# Patient Record
Sex: MALE | Race: WHITE | NOT HISPANIC OR LATINO | URBAN - METROPOLITAN AREA
[De-identification: names, ages, dates, MRNs, and addresses within clinical notes are randomized per-mention and may not be internally consistent; named-entity substitution may affect disease eponyms.]

---

## 2019-05-22 ENCOUNTER — OFFICE VISIT (OUTPATIENT)
Dept: INTERNAL MEDICINE | Facility: CLINIC | Age: 24
End: 2019-05-22
Payer: MEDICAID

## 2019-05-22 VITALS
HEIGHT: 65 IN | TEMPERATURE: 98 F | WEIGHT: 132 LBS | DIASTOLIC BLOOD PRESSURE: 67 MMHG | SYSTOLIC BLOOD PRESSURE: 111 MMHG | RESPIRATION RATE: 16 BRPM | OXYGEN SATURATION: 97 % | BODY MASS INDEX: 21.99 KG/M2 | HEART RATE: 65 BPM

## 2019-05-22 DIAGNOSIS — M20.12 HALLUX VALGUS OF LEFT FOOT: Primary | ICD-10-CM

## 2019-05-22 DIAGNOSIS — Z00.00 ANNUAL PHYSICAL EXAM: ICD-10-CM

## 2019-05-22 PROCEDURE — 99395 PREV VISIT EST AGE 18-39: CPT | Mod: S$GLB,,, | Performed by: NURSE PRACTITIONER

## 2019-05-22 PROCEDURE — 99395 PR PREVENTIVE VISIT,EST,18-39: ICD-10-PCS | Mod: S$GLB,,, | Performed by: NURSE PRACTITIONER

## 2019-05-22 NOTE — PROGRESS NOTES
Subjective:       Patient ID: Ernesto Bustillos is a 23 y.o. male.    Chief Complaint: Establish Care    Pt states onset since birth with left foot deformity, hallux valgus- states now starting with limp, pain to foot, destroying shoes, getting ingrown toenails to left great toe    Review of Systems   Constitutional: Negative.    HENT: Negative.    Eyes: Negative.    Respiratory: Negative.    Cardiovascular: Negative.    Gastrointestinal: Negative.    Genitourinary: Negative.    Musculoskeletal: Positive for arthralgias (left foot pain, now starting with right achilles pain from compensation) and gait problem (favor left leg).   Skin: Negative.    Psychiatric/Behavioral: Negative.        Objective:      Physical Exam   Constitutional: He is oriented to person, place, and time. He appears well-developed and well-nourished.   HENT:   Head: Normocephalic.   Right Ear: External ear normal.   Left Ear: External ear normal.   Eyes: Right eye exhibits no discharge. Left eye exhibits no discharge.   Neck: Neck supple. No tracheal deviation present. No thyromegaly present.   Cardiovascular: Normal rate, regular rhythm, normal heart sounds and intact distal pulses.   No murmur heard.  Pulmonary/Chest: Effort normal and breath sounds normal. He has no wheezes.   Abdominal: Soft. Bowel sounds are normal. He exhibits no mass. There is no tenderness. There is no guarding.   Musculoskeletal: He exhibits no edema.        Left foot: There is deformity (hallux valgus).   Feet:   Left Foot:   Skin Integrity: Positive for erythema. Negative for ulcer or skin breakdown.   Neurological: He is alert and oriented to person, place, and time.   Skin: Skin is warm and dry.   Psychiatric: He has a normal mood and affect. His behavior is normal. Judgment normal.       Assessment:       No diagnosis found.    Plan:       1. Hallux valgus of left foot  Ambulatory referral to Orthopedics   2. Annual physical exam  CBC auto differential    TSH     Comprehensive metabolic panel    Lipid panel

## 2019-05-28 LAB
ABS NRBC COUNT: 0 X 10 3/UL (ref 0–0.01)
ABSOLUTE BASOPHIL: 0.01 X 10 3/UL (ref 0–0.22)
ABSOLUTE EOSINOPHIL: 0.14 X 10 3/UL (ref 0.04–0.54)
ABSOLUTE IMMATURE GRAN: 0.01 X 10 3/UL (ref 0–0.04)
ABSOLUTE LYMPHOCYTE: 1.15 X 10 3/UL (ref 0.86–4.75)
ABSOLUTE MONOCYTE: 0.42 X 10 3/UL (ref 0.22–1.08)
ALBUMIN SERPL-MCNC: 5.1 G/DL (ref 3.5–5.2)
ALBUMIN/GLOB SERPL ELPH: 2.4 {RATIO} (ref 1–2.7)
ALP ISOS SERPL LEV INH-CCNC: 75 IU/L (ref 40–130)
ALT (SGPT): 46 U/L (ref 0–41)
ANION GAP SERPL CALC-SCNC: 8 MMOL/L (ref 8–17)
AST SERPL-CCNC: 32 U/L (ref 0–40)
BASOPHILS NFR BLD: 0.2 %
BILIRUBIN, TOTAL: 0.3 MG/DL (ref 0–1.2)
BUN/CREAT SERPL: 10.2 (ref 6–20)
CALCIUM SERPL-MCNC: 9.9 MG/DL (ref 8.6–10.2)
CARBON DIOXIDE, CO2: 33 MMOL/L (ref 22–29)
CHLORIDE: 103 MMOL/L (ref 98–107)
CHOLEST SERPL-MSCNC: 173 MG/DL (ref 100–200)
CREAT SERPL-MCNC: 0.82 MG/DL (ref 0.7–1.2)
EOSINOPHIL NFR BLD: 2.6 %
GFR ESTIMATION: 115.43
GLOBULIN: 2.1 G/DL (ref 1.5–4.5)
GLUCOSE: 97 MG/DL (ref 74–106)
HCT VFR BLD AUTO: 43.6 % (ref 42–52)
HDLC SERPL-MCNC: 71 MG/DL
HGB BLD-MCNC: 14 G/DL (ref 14–18)
IMMATURE GRANULOCYTES: 0.2 % (ref 0–0.5)
LDL/HDL RATIO: 1.3 (ref 1–3)
LDLC SERPL CALC-MCNC: 93.8 MG/DL (ref 0–100)
LYMPHOCYTES NFR BLD: 21.6 %
MCH RBC QN AUTO: 29.8 PG (ref 27–32)
MCHC RBC AUTO-ENTMCNC: 32.1 G/DL (ref 32–36)
MCV RBC AUTO: 92.8 FL (ref 80–94)
MONOCYTES NFR BLD: 7.9 %
NEUTROPHILS ABSOLUTE COUNT: 3.6 X 10 3/UL (ref 2.15–7.56)
NEUTROPHILS NFR BLD: 67.5 %
NUCLEATED RED BLOOD CELLS: 0 /100 WBC (ref 0–0.2)
PLATELET # BLD AUTO: 212 X 10 3/UL (ref 135–400)
POTASSIUM: 4.8 MMOL/L (ref 3.5–5.1)
PROT SNV-MCNC: 7.2 G/DL (ref 6.4–8.3)
RBC # BLD AUTO: 4.7 X 10 6/UL (ref 4.7–6.1)
RDW-SD: 41.1 FL (ref 37–54)
SODIUM: 144 MMOL/L (ref 136–145)
TRIGL SERPL-MCNC: 41 MG/DL (ref 0–150)
TSH SERPL DL<=0.005 MIU/L-ACNC: 1.17 UIU/ML (ref 0.27–4.2)
UREA NITROGEN (BUN): 8.4 MG/DL (ref 6–20)
WBC # BLD: 5.33 X 10 3/UL (ref 4.3–10.8)

## 2019-06-27 ENCOUNTER — TELEPHONE (OUTPATIENT)
Dept: INTERNAL MEDICINE | Facility: CLINIC | Age: 24
End: 2019-06-27

## 2019-07-03 ENCOUNTER — OFFICE VISIT (OUTPATIENT)
Dept: ORTHOPEDICS | Facility: CLINIC | Age: 24
End: 2019-07-03
Payer: MEDICAID

## 2019-07-03 VITALS — TEMPERATURE: 98 F | WEIGHT: 126.63 LBS | HEIGHT: 64 IN | BODY MASS INDEX: 21.62 KG/M2

## 2019-07-03 DIAGNOSIS — M20.12 HALLUX VALGUS OF LEFT FOOT: ICD-10-CM

## 2019-07-03 DIAGNOSIS — M21.612 BUNION OF GREAT TOE OF LEFT FOOT: Primary | ICD-10-CM

## 2019-07-03 PROCEDURE — 99201 PR OFFICE/OUTPT VISIT,NEW,LEVL I: CPT | Mod: 25,S$GLB,, | Performed by: ORTHOPAEDIC SURGERY

## 2019-07-03 PROCEDURE — 73620 PR  X-RAY FOOT 2 VW: ICD-10-PCS | Mod: TC,LT,S$GLB, | Performed by: ORTHOPAEDIC SURGERY

## 2019-07-03 PROCEDURE — 73620 X-RAY EXAM OF FOOT: CPT | Mod: TC,LT,S$GLB, | Performed by: ORTHOPAEDIC SURGERY

## 2019-07-03 PROCEDURE — 99201 PR OFFICE/OUTPT VISIT,NEW,LEVL I: ICD-10-PCS | Mod: 25,S$GLB,, | Performed by: ORTHOPAEDIC SURGERY

## 2019-07-03 NOTE — PROGRESS NOTES
Subjective:      Patient ID: Ernesto Bustillos is a 23 y.o. male.    Chief Complaint: Pain of the Left Foot    HPI this is a 23-year-old man with a lifelong history of left hallux valgus with bunion deformity.  The great toe under rides the 2nd toe.  It has recently become very painful.  He has tried wearing wider shoes without relief    Review of Systems   Constitution: Negative for fever and weight loss.   Cardiovascular: Negative for chest pain and leg swelling.   Musculoskeletal: Positive for joint pain and joint swelling. Negative for arthritis, muscle weakness and stiffness.   Gastrointestinal: Negative for change in bowel habit.   Genitourinary: Negative for bladder incontinence and hematuria.   Neurological: Negative for focal weakness, numbness, paresthesias and sensory change.         Objective:      Examination shows significant hallux valgus on the left.  Active and passive range of motion of the 1st MTP joint is normal.  The great toe completely under rides the 2nd toe.  He is tender with palpation of the 1st MTP joint and has mild swelling and redness      Ortho/SPM Exam            Assessment:       Encounter Diagnoses   Name Primary?    Bunion of great toe of left foot Yes    Hallux valgus of left foot           Plan:       Ernesto was seen today for pain.    Diagnoses and all orders for this visit:    Bunion of great toe of left foot    Hallux valgus of left foot     X-ray of the left foot shows an intermetatarsal angle between the 1st and 2nd metatarsals to be 9°.  The metatarsophalangeal angle is 35°.  Recommend bunionectomy with chevron osteotomy and Akin osteotomy.  He will also have release of the adductor tendon.  Will do this in the near future at his convenience

## 2019-07-15 LAB
BASOPHILS NFR SNV MANUAL: 0.4 % (ref 0–3)
BUN SERPL-MCNC: 11 MG/DL (ref 7–18)
BUN/CREAT SERPL: 13.25 RATIO (ref 7–18)
CALCIUM SERPL-MCNC: 9.3 MG/DL (ref 8.8–10.5)
CHLORIDE SERPL-SCNC: 107 MMOL/L (ref 100–108)
CO2 SERPL-SCNC: 29 MMOL/L (ref 21–32)
CREAT SERPL-MCNC: 0.83 MG/DL (ref 0.7–1.3)
EOSINOPHIL NFR SNV MANUAL: 2.5 % (ref 1–3)
ERYTHROCYTE [DISTWIDTH] IN BLOOD BY AUTOMATED COUNT: 11.7 % (ref 12.5–18)
GFR ESTIMATION: > 60
GLUCOSE SERPL-MCNC: 94 MG/DL (ref 70–110)
HCT VFR BLD AUTO: 42.6 % (ref 42–52)
HGB BLD-MCNC: 14.8 G/DL (ref 14–18)
LYMPHOCYTES NFR SNV MANUAL: 30.5 % (ref 25–40)
MANUAL NRBC PER 100 CELLS: 0 %
MCH RBC QN AUTO: 30.1 PG (ref 27–31.2)
MCHC RBC AUTO-ENTMCNC: 34.7 G/DL (ref 31.8–35.4)
MCV RBC AUTO: 86.8 FL (ref 80–97)
MONOCYTES/100 LEUKOCYTES: 8.7 % (ref 1–15)
NEUTROPHILS NFR BLD: 2.97 10*3/UL (ref 1.8–7.7)
NEUTROPHILS NFR SNV MANUAL: 57.7 % (ref 37–80)
PLATELETS: 194 10*3/UL (ref 142–424)
POTASSIUM SERPL-SCNC: 4.2 MMOL/L (ref 3.6–5.2)
RBC # BLD AUTO: 4.91 10*6/UL (ref 4.7–6.1)
SODIUM BLD-SCNC: 144 MMOL/L (ref 135–145)
WBC # BLD: 5.2 10*3/UL (ref 4.6–10.2)

## 2019-07-16 ENCOUNTER — OUTSIDE PLACE OF SERVICE (OUTPATIENT)
Dept: ADMINISTRATIVE | Facility: OTHER | Age: 24
End: 2019-07-16
Payer: MEDICAID

## 2019-07-16 PROCEDURE — 28296 PR CORRECT BUNION, DISTAL METATARSAL OSTEOTOMY: ICD-10-PCS | Mod: LT,,, | Performed by: ORTHOPAEDIC SURGERY

## 2019-07-16 PROCEDURE — 28296 COR HLX VLGS DSTL MTAR OSTEO: CPT | Mod: LT,,, | Performed by: ORTHOPAEDIC SURGERY

## 2019-07-23 ENCOUNTER — OFFICE VISIT (OUTPATIENT)
Dept: ORTHOPEDICS | Facility: CLINIC | Age: 24
End: 2019-07-23
Payer: MEDICAID

## 2019-07-23 DIAGNOSIS — M21.612 BUNION OF GREAT TOE OF LEFT FOOT: Primary | ICD-10-CM

## 2019-07-23 PROCEDURE — 99024 PR POST-OP FOLLOW-UP VISIT: ICD-10-PCS | Mod: S$GLB,,, | Performed by: ORTHOPAEDIC SURGERY

## 2019-07-23 PROCEDURE — 99024 POSTOP FOLLOW-UP VISIT: CPT | Mod: S$GLB,,, | Performed by: ORTHOPAEDIC SURGERY

## 2019-07-23 RX ORDER — OXYCODONE AND ACETAMINOPHEN 5; 325 MG/1; MG/1
1 TABLET ORAL EVERY 8 HOURS PRN
Qty: 21 TABLET | Refills: 0
Start: 2019-07-23 | End: 2021-12-21 | Stop reason: ALTCHOICE

## 2019-07-23 NOTE — PROGRESS NOTES
Subjective:      Patient ID: Ernesto Bustillos is a 23 y.o. male.    Chief Complaint: Post-op Evaluation    HPI patient is 1 week postop bunionectomy with double osteotomy of the 1st metatarsal and great toe.    ROS unchanged from prior visit      Objective:        The incisions are clean.  There is no drainage.  There is minimal swelling.  There is no increased redness or warmth    Ortho/SPM Exam            Assessment:       Encounter Diagnosis   Name Primary?    Bunion of great toe of left foot Yes          Plan:       Ernesto was seen today for post-op evaluation.    Diagnoses and all orders for this visit:    Bunion of great toe of left foot     He is placed in his recovery insoles and night splints.  Return 1 week for suture removal

## 2019-07-29 RX ORDER — OXYCODONE AND ACETAMINOPHEN 10; 325 MG/1; MG/1
TABLET ORAL
Refills: 0 | COMMUNITY
Start: 2019-07-16 | End: 2021-12-21 | Stop reason: ALTCHOICE

## 2019-07-30 ENCOUNTER — OFFICE VISIT (OUTPATIENT)
Dept: ORTHOPEDICS | Facility: CLINIC | Age: 24
End: 2019-07-30
Payer: MEDICAID

## 2019-07-30 DIAGNOSIS — M21.612 BUNION OF GREAT TOE OF LEFT FOOT: Primary | ICD-10-CM

## 2019-07-30 PROCEDURE — 99024 POSTOP FOLLOW-UP VISIT: CPT | Mod: S$GLB,,, | Performed by: ORTHOPAEDIC SURGERY

## 2019-07-30 PROCEDURE — 99024 PR POST-OP FOLLOW-UP VISIT: ICD-10-PCS | Mod: S$GLB,,, | Performed by: ORTHOPAEDIC SURGERY

## 2019-07-30 NOTE — PROGRESS NOTES
Subjective:      Patient ID: Ernesto Bustillos is a 23 y.o. male.    Chief Complaint: Post-op Evaluation    HPI patient is 2 weeks postop bunionectomy with double osteotomy.  He is still ambulating with crutches weight-bearing on his heel  ROS    unchanged from prior visit  Objective:            Ortho/SPM Exam  Incisions are clean.  There is no drainage. He has mild to moderate swelling          Assessment:       Encounter Diagnosis   Name Primary?    Bunion of great toe of left foot Yes          Plan:       Ernesto was seen today for post-op evaluation.    Diagnoses and all orders for this visit:    Bunion of great toe of left foot     Sutures are removed today.  He is to continue with his recovery and soles and night splints.  Return 2 weeks for recheck

## 2019-08-12 RX ORDER — OXYCODONE AND ACETAMINOPHEN 7.5; 325 MG/1; MG/1
TABLET ORAL
Refills: 0 | COMMUNITY
Start: 2019-07-23 | End: 2021-12-21 | Stop reason: ALTCHOICE

## 2019-08-13 ENCOUNTER — OFFICE VISIT (OUTPATIENT)
Dept: ORTHOPEDICS | Facility: CLINIC | Age: 24
End: 2019-08-13
Payer: MEDICAID

## 2019-08-13 DIAGNOSIS — M20.12 HALLUX VALGUS OF LEFT FOOT: Primary | ICD-10-CM

## 2019-08-13 PROCEDURE — 99024 POSTOP FOLLOW-UP VISIT: CPT | Mod: S$GLB,,, | Performed by: ORTHOPAEDIC SURGERY

## 2019-08-13 PROCEDURE — 99024 PR POST-OP FOLLOW-UP VISIT: ICD-10-PCS | Mod: S$GLB,,, | Performed by: ORTHOPAEDIC SURGERY

## 2019-08-13 RX ORDER — HYDROCODONE BITARTRATE AND ACETAMINOPHEN 7.5; 325 MG/1; MG/1
TABLET ORAL
Refills: 0 | COMMUNITY
Start: 2019-07-30 | End: 2021-12-21 | Stop reason: ALTCHOICE

## 2019-08-13 NOTE — PROGRESS NOTES
Subjective:      Patient ID: Ernesto Bustillos is a 23 y.o. male.    Chief Complaint: Post-op Evaluation of the Left Foot    HPI the patient is 1 month postop bunionectomy with chevron and Akin osteotomy.  He complains of pain beneath the bottom of the 1st metatarsal head    ROS    unchanged from prior visit  Objective:            Ortho/SPM Exam  His incisions are well healed.  He has good correction of his hallux valgus          Assessment:       Encounter Diagnosis   Name Primary?    Hallux valgus of left foot Yes          Plan:       Ernesto was seen today for post-op evaluation.    Diagnoses and all orders for this visit:    Hallux valgus of left foot     He is placed in a toe spacer and can wean back into a wide shoe

## 2019-09-03 ENCOUNTER — OFFICE VISIT (OUTPATIENT)
Dept: ORTHOPEDICS | Facility: CLINIC | Age: 24
End: 2019-09-03
Payer: MEDICAID

## 2019-09-03 DIAGNOSIS — M20.12 HALLUX VALGUS OF LEFT FOOT: Primary | ICD-10-CM

## 2019-09-03 PROCEDURE — 99024 PR POST-OP FOLLOW-UP VISIT: ICD-10-PCS | Mod: S$GLB,,, | Performed by: ORTHOPAEDIC SURGERY

## 2019-09-03 PROCEDURE — 99024 POSTOP FOLLOW-UP VISIT: CPT | Mod: S$GLB,,, | Performed by: ORTHOPAEDIC SURGERY

## 2019-09-03 NOTE — PROGRESS NOTES
Subjective:      Patient ID: Ernesto Bustillos is a 23 y.o. male.    Chief Complaint: Post-op Evaluation of the Left Foot    HPI patient is 7 weeks postop bunionectomy with double osteotomy.  He complains of pain at the MTP joint    ROS unchanged from prior visit      Objective:            Ortho/SPM Exam  He has excellent correction of the hallux valgus and bunion deformity.  He has marked limitation of range of motion and this is causing his pain. He only extends approximately 10-12 degrees and flexes 20° at the MTP joint.          Assessment:       Encounter Diagnosis   Name Primary?    Hallux valgus of left foot Yes          Plan:       Ernesto was seen today for post-op evaluation.    Diagnoses and all orders for this visit:    Hallux valgus of left foot      He is instructed in passive stretching of the MTP joint.  Return 3 weeks for recheck

## 2019-09-24 ENCOUNTER — OFFICE VISIT (OUTPATIENT)
Dept: ORTHOPEDICS | Facility: CLINIC | Age: 24
End: 2019-09-24
Payer: MEDICAID

## 2019-09-24 DIAGNOSIS — M20.12 HALLUX VALGUS OF LEFT FOOT: Primary | ICD-10-CM

## 2019-09-24 PROCEDURE — 99024 PR POST-OP FOLLOW-UP VISIT: ICD-10-PCS | Mod: S$GLB,,, | Performed by: ORTHOPAEDIC SURGERY

## 2019-09-24 PROCEDURE — 99024 POSTOP FOLLOW-UP VISIT: CPT | Mod: S$GLB,,, | Performed by: ORTHOPAEDIC SURGERY

## 2019-09-24 NOTE — PROGRESS NOTES
Subjective:      Patient ID: Ernesto Bustillos is a 23 y.o. male.    Chief Complaint: Post-op Evaluation and Foot Pain (LT)    HPI patient is here for follow-up following bunionectomy with Akin osteotomy of the left great toe    ROS    unchanged from prior visit  Objective:        Incisions are well healed.  He has 20° of extension and 20° of flexion at the MTP joint    Ortho/SPM Exam            Assessment:       Encounter Diagnosis   Name Primary?    Hallux valgus of left foot Yes          Plan:       Ernesto was seen today for post-op evaluation and foot pain.    Diagnoses and all orders for this visit:    Hallux valgus of left foot     He is to continue with active and passive range of motion of the 1st MTP joint.  Return p.r.n.

## 2020-11-06 ENCOUNTER — OFFICE VISIT (OUTPATIENT)
Dept: ORTHOPEDICS | Facility: CLINIC | Age: 25
End: 2020-11-06
Payer: MEDICAID

## 2020-11-06 VITALS — TEMPERATURE: 98 F | BODY MASS INDEX: 21.68 KG/M2 | WEIGHT: 130.13 LBS | HEIGHT: 65 IN

## 2020-11-06 DIAGNOSIS — M20.12 HALLUX VALGUS OF LEFT FOOT: Primary | ICD-10-CM

## 2020-11-06 PROCEDURE — 99212 OFFICE O/P EST SF 10 MIN: CPT | Mod: S$GLB,,, | Performed by: ORTHOPAEDIC SURGERY

## 2020-11-06 PROCEDURE — 99212 PR OFFICE/OUTPT VISIT, EST, LEVL II, 10-19 MIN: ICD-10-PCS | Mod: S$GLB,,, | Performed by: ORTHOPAEDIC SURGERY

## 2020-11-06 RX ORDER — CEPHALEXIN 500 MG/1
500 CAPSULE ORAL
COMMUNITY
Start: 2020-03-30 | End: 2021-12-21 | Stop reason: ALTCHOICE

## 2020-11-06 RX ORDER — DICLOFENAC SODIUM 75 MG/1
75 TABLET, DELAYED RELEASE ORAL
COMMUNITY
Start: 2020-03-30 | End: 2021-12-21 | Stop reason: ALTCHOICE

## 2020-11-06 NOTE — PROGRESS NOTES
Subjective:      Patient ID: Ernesto Bustillos is a 25 y.o. male.    Chief Complaint: Pain of the Left Foot    HPI patient is here for final check on his left foot.  He is doing well he has occasional discomfort in his 2nd toe.  ROS    unchanged from prior visit  Objective:      Incision is well healed.  His toe is in good alignment.  Range of motion is normal    Ortho/SPM Exam            Assessment:       Encounter Diagnosis   Name Primary?    Hallux valgus of left foot Yes          Plan:       Ernesto was seen today for pain.    Diagnoses and all orders for this visit:    Hallux valgus of left foot     Return p.r.n.

## 2021-12-20 ENCOUNTER — TELEPHONE (OUTPATIENT)
Dept: PRIMARY CARE CLINIC | Facility: CLINIC | Age: 26
End: 2021-12-20
Payer: MEDICAID

## 2021-12-21 ENCOUNTER — OFFICE VISIT (OUTPATIENT)
Dept: PRIMARY CARE CLINIC | Facility: CLINIC | Age: 26
End: 2021-12-21
Payer: MEDICAID

## 2021-12-21 ENCOUNTER — TELEPHONE (OUTPATIENT)
Dept: PRIMARY CARE CLINIC | Facility: CLINIC | Age: 26
End: 2021-12-21

## 2021-12-21 VITALS
WEIGHT: 121 LBS | OXYGEN SATURATION: 98 % | SYSTOLIC BLOOD PRESSURE: 118 MMHG | HEIGHT: 65 IN | DIASTOLIC BLOOD PRESSURE: 65 MMHG | BODY MASS INDEX: 20.16 KG/M2 | HEART RATE: 63 BPM

## 2021-12-21 DIAGNOSIS — S69.92XD INJURY OF LEFT HAND, SUBSEQUENT ENCOUNTER: Primary | ICD-10-CM

## 2021-12-21 PROCEDURE — 99212 PR OFFICE/OUTPT VISIT, EST, LEVL II, 10-19 MIN: ICD-10-PCS | Mod: S$GLB,,, | Performed by: INTERNAL MEDICINE

## 2021-12-21 PROCEDURE — 99212 OFFICE O/P EST SF 10 MIN: CPT | Mod: S$GLB,,, | Performed by: INTERNAL MEDICINE

## 2021-12-21 RX ORDER — TRAMADOL HYDROCHLORIDE 50 MG/1
50 TABLET ORAL EVERY 6 HOURS PRN
COMMUNITY
Start: 2021-12-13

## 2021-12-21 RX ORDER — IBUPROFEN 600 MG/1
600 TABLET ORAL
COMMUNITY
Start: 2021-12-13

## 2021-12-28 ENCOUNTER — TELEPHONE (OUTPATIENT)
Dept: PRIMARY CARE CLINIC | Facility: CLINIC | Age: 26
End: 2021-12-28
Payer: MEDICAID

## 2021-12-29 ENCOUNTER — OFFICE VISIT (OUTPATIENT)
Dept: FAMILY MEDICINE | Facility: CLINIC | Age: 26
End: 2021-12-29
Payer: MEDICAID

## 2021-12-29 VITALS
OXYGEN SATURATION: 97 % | TEMPERATURE: 98 F | DIASTOLIC BLOOD PRESSURE: 46 MMHG | HEART RATE: 67 BPM | HEIGHT: 65 IN | SYSTOLIC BLOOD PRESSURE: 110 MMHG | WEIGHT: 120 LBS | RESPIRATION RATE: 18 BRPM | BODY MASS INDEX: 19.99 KG/M2

## 2021-12-29 DIAGNOSIS — S69.92XD INJURY OF LEFT HAND, SUBSEQUENT ENCOUNTER: Primary | ICD-10-CM

## 2021-12-29 PROCEDURE — 1159F PR MEDICATION LIST DOCUMENTED IN MEDICAL RECORD: ICD-10-PCS | Mod: CPTII,S$GLB,, | Performed by: OBSTETRICS & GYNECOLOGY

## 2021-12-29 PROCEDURE — 1159F MED LIST DOCD IN RCRD: CPT | Mod: CPTII,S$GLB,, | Performed by: OBSTETRICS & GYNECOLOGY

## 2021-12-29 PROCEDURE — 3078F DIAST BP <80 MM HG: CPT | Mod: CPTII,S$GLB,, | Performed by: OBSTETRICS & GYNECOLOGY

## 2021-12-29 PROCEDURE — 99213 OFFICE O/P EST LOW 20 MIN: CPT | Mod: S$GLB,,, | Performed by: OBSTETRICS & GYNECOLOGY

## 2021-12-29 PROCEDURE — 3074F SYST BP LT 130 MM HG: CPT | Mod: CPTII,S$GLB,, | Performed by: OBSTETRICS & GYNECOLOGY

## 2021-12-29 PROCEDURE — 3078F PR MOST RECENT DIASTOLIC BLOOD PRESSURE < 80 MM HG: ICD-10-PCS | Mod: CPTII,S$GLB,, | Performed by: OBSTETRICS & GYNECOLOGY

## 2021-12-29 PROCEDURE — 3074F PR MOST RECENT SYSTOLIC BLOOD PRESSURE < 130 MM HG: ICD-10-PCS | Mod: CPTII,S$GLB,, | Performed by: OBSTETRICS & GYNECOLOGY

## 2021-12-29 PROCEDURE — 3008F BODY MASS INDEX DOCD: CPT | Mod: CPTII,S$GLB,, | Performed by: OBSTETRICS & GYNECOLOGY

## 2021-12-29 PROCEDURE — 1160F PR REVIEW ALL MEDS BY PRESCRIBER/CLIN PHARMACIST DOCUMENTED: ICD-10-PCS | Mod: CPTII,S$GLB,, | Performed by: OBSTETRICS & GYNECOLOGY

## 2021-12-29 PROCEDURE — 1160F RVW MEDS BY RX/DR IN RCRD: CPT | Mod: CPTII,S$GLB,, | Performed by: OBSTETRICS & GYNECOLOGY

## 2021-12-29 PROCEDURE — 99213 PR OFFICE/OUTPT VISIT, EST, LEVL III, 20-29 MIN: ICD-10-PCS | Mod: S$GLB,,, | Performed by: OBSTETRICS & GYNECOLOGY

## 2021-12-29 PROCEDURE — 3008F PR BODY MASS INDEX (BMI) DOCUMENTED: ICD-10-PCS | Mod: CPTII,S$GLB,, | Performed by: OBSTETRICS & GYNECOLOGY
